# Patient Record
Sex: FEMALE
[De-identification: names, ages, dates, MRNs, and addresses within clinical notes are randomized per-mention and may not be internally consistent; named-entity substitution may affect disease eponyms.]

---

## 2022-01-23 ENCOUNTER — APPOINTMENT (OUTPATIENT)
Dept: AFTER HOURS CARE | Facility: EMERGENCY ROOM | Age: 72
End: 2022-01-23
Payer: MEDICARE

## 2022-01-23 PROCEDURE — 99442: CPT | Mod: 95

## 2022-01-28 PROBLEM — Z00.00 ENCOUNTER FOR PREVENTIVE HEALTH EXAMINATION: Status: ACTIVE | Noted: 2022-01-28

## 2022-04-25 NOTE — PLAN
[FreeTextEntry1] : -Recommended drinking half bottle mag citrate over the course of 10-20 minutes and then wait a period of time before drinking the remaining half if she was able to tolerate\par -Recommended calling Dr. Martin's office tomorrow for guidance as to whether or not her colonoscopy tomorrow could still be performed given her inability to tolerate half the of her prep.  Patient agreed, stated she would call Dr. Martin's office and leave a message

## 2022-04-25 NOTE — HISTORY OF PRESENT ILLNESS
[Home] : at home, [unfilled] , at the time of the visit. [Other Location: e.g. Home (Enter Location, City,State)___] : at [unfilled] [Verbal consent obtained from patient] : the patient, [unfilled] [FreeTextEntry8] : Encounter was performed by phone.  Patient understands limitation of my evaluation and recommendations as I am not able to do a full visual evaluation.\par \par 71 year old woman calling regarding guidance on her upcoming colonoscopy tomorrow with Dr. Martin.  Patient states she was instructed to drink two 10oz mag citrate bottles prior to 10p tonight.  She drank one of the bottles and then vomited approximately 20 minutes later.  No rash, sob, throat closing/tightness, tongue swelling, syncope, chest pain, sob.  No BM yet.  Called Dr. Martin's office requesting instruction on remaining 10oz mag citrate as she feels she cannot drink it.

## 2022-04-25 NOTE — ASSESSMENT
[FreeTextEntry1] : Vomiting after mag citrate for colonoscopy prep.  No e/o anaphylaxis though obvious limitations of telephone encounter limit more accurate diagnosis.  No respiratory distress noted during the call - patient denied SOB and was able to speak in full sentences without limitation.

## 2023-03-13 NOTE — REVIEW OF SYSTEMS
Prescription sent to preferred pharmacy per protocol. Please schedule patient's physical.   [Fever] : no fever [Discharge] : no discharge